# Patient Record
Sex: MALE | Race: WHITE | NOT HISPANIC OR LATINO | URBAN - METROPOLITAN AREA
[De-identification: names, ages, dates, MRNs, and addresses within clinical notes are randomized per-mention and may not be internally consistent; named-entity substitution may affect disease eponyms.]

---

## 2019-02-05 ENCOUNTER — RECORDS - HEALTHEAST (OUTPATIENT)
Dept: LAB | Facility: CLINIC | Age: 59
End: 2019-02-05

## 2019-02-05 LAB — POTASSIUM BLD-SCNC: 3.8 MMOL/L (ref 3.5–5)

## 2021-06-16 PROBLEM — S52.022K: Status: ACTIVE | Noted: 2019-02-04

## 2025-03-14 ENCOUNTER — ANESTHESIA EVENT (OUTPATIENT)
Dept: SURGERY | Facility: AMBULATORY SURGERY CENTER | Age: 65
End: 2025-03-14
Payer: COMMERCIAL

## 2025-03-14 RX ORDER — CARVEDILOL 12.5 MG/1
12.5 TABLET ORAL 2 TIMES DAILY WITH MEALS
COMMUNITY

## 2025-03-14 RX ORDER — ATORVASTATIN CALCIUM 20 MG/1
20 TABLET, FILM COATED ORAL DAILY
COMMUNITY

## 2025-03-14 RX ORDER — TAMSULOSIN HYDROCHLORIDE 0.4 MG/1
0.4 CAPSULE ORAL DAILY
COMMUNITY

## 2025-03-14 RX ORDER — AMLODIPINE BESYLATE 2.5 MG/1
2.5 TABLET ORAL DAILY
COMMUNITY

## 2025-03-17 ENCOUNTER — HOSPITAL ENCOUNTER (OUTPATIENT)
Facility: AMBULATORY SURGERY CENTER | Age: 65
Discharge: HOME | End: 2025-03-17
Attending: COLON & RECTAL SURGERY
Payer: COMMERCIAL

## 2025-03-17 ENCOUNTER — ANESTHESIA (OUTPATIENT)
Dept: SURGERY | Facility: AMBULATORY SURGERY CENTER | Age: 65
End: 2025-03-17
Payer: COMMERCIAL

## 2025-03-17 VITALS
OXYGEN SATURATION: 95 % | TEMPERATURE: 97.1 F | DIASTOLIC BLOOD PRESSURE: 57 MMHG | BODY MASS INDEX: 33.74 KG/M2 | WEIGHT: 215 LBS | RESPIRATION RATE: 16 BRPM | HEIGHT: 67 IN | SYSTOLIC BLOOD PRESSURE: 114 MMHG

## 2025-03-17 DIAGNOSIS — Z86.0100 PERSONAL HISTORY OF COLON POLYPS, UNSPECIFIED: ICD-10-CM

## 2025-03-17 LAB — COLONOSCOPY: NORMAL

## 2025-03-17 RX ORDER — FENTANYL CITRATE 0.05 MG/ML
25 INJECTION, SOLUTION INTRAMUSCULAR; INTRAVENOUS
Status: DISCONTINUED | OUTPATIENT
Start: 2025-03-17 | End: 2025-03-18 | Stop reason: HOSPADM

## 2025-03-17 RX ORDER — NALOXONE HYDROCHLORIDE 0.4 MG/ML
0.1 INJECTION, SOLUTION INTRAMUSCULAR; INTRAVENOUS; SUBCUTANEOUS
Status: DISCONTINUED | OUTPATIENT
Start: 2025-03-17 | End: 2025-03-18 | Stop reason: HOSPADM

## 2025-03-17 RX ORDER — OXYCODONE HYDROCHLORIDE 10 MG/1
10 TABLET ORAL
Status: DISCONTINUED | OUTPATIENT
Start: 2025-03-17 | End: 2025-03-18 | Stop reason: HOSPADM

## 2025-03-17 RX ORDER — OXYCODONE HYDROCHLORIDE 5 MG/1
5 TABLET ORAL
Status: DISCONTINUED | OUTPATIENT
Start: 2025-03-17 | End: 2025-03-18 | Stop reason: HOSPADM

## 2025-03-17 RX ORDER — SODIUM CHLORIDE, SODIUM LACTATE, POTASSIUM CHLORIDE, CALCIUM CHLORIDE 600; 310; 30; 20 MG/100ML; MG/100ML; MG/100ML; MG/100ML
INJECTION, SOLUTION INTRAVENOUS CONTINUOUS
Status: DISCONTINUED | OUTPATIENT
Start: 2025-03-17 | End: 2025-03-18 | Stop reason: HOSPADM

## 2025-03-17 RX ORDER — HYDROMORPHONE HCL IN WATER/PF 6 MG/30 ML
0.4 PATIENT CONTROLLED ANALGESIA SYRINGE INTRAVENOUS EVERY 5 MIN PRN
Status: DISCONTINUED | OUTPATIENT
Start: 2025-03-17 | End: 2025-03-18 | Stop reason: HOSPADM

## 2025-03-17 RX ORDER — ONDANSETRON 2 MG/ML
4 INJECTION INTRAMUSCULAR; INTRAVENOUS EVERY 30 MIN PRN
Status: DISCONTINUED | OUTPATIENT
Start: 2025-03-17 | End: 2025-03-18 | Stop reason: HOSPADM

## 2025-03-17 RX ORDER — DEXAMETHASONE SODIUM PHOSPHATE 4 MG/ML
4 INJECTION, SOLUTION INTRA-ARTICULAR; INTRALESIONAL; INTRAMUSCULAR; INTRAVENOUS; SOFT TISSUE
Status: DISCONTINUED | OUTPATIENT
Start: 2025-03-17 | End: 2025-03-18 | Stop reason: HOSPADM

## 2025-03-17 RX ORDER — ONDANSETRON 4 MG/1
4 TABLET, ORALLY DISINTEGRATING ORAL EVERY 30 MIN PRN
Status: DISCONTINUED | OUTPATIENT
Start: 2025-03-17 | End: 2025-03-18 | Stop reason: HOSPADM

## 2025-03-17 RX ORDER — GLYCOPYRROLATE 0.2 MG/ML
INJECTION, SOLUTION INTRAMUSCULAR; INTRAVENOUS PRN
Status: DISCONTINUED | OUTPATIENT
Start: 2025-03-17 | End: 2025-03-17

## 2025-03-17 RX ORDER — FENTANYL CITRATE 0.05 MG/ML
25 INJECTION, SOLUTION INTRAMUSCULAR; INTRAVENOUS EVERY 5 MIN PRN
Status: DISCONTINUED | OUTPATIENT
Start: 2025-03-17 | End: 2025-03-18 | Stop reason: HOSPADM

## 2025-03-17 RX ORDER — PROPOFOL 10 MG/ML
INJECTION, EMULSION INTRAVENOUS PRN
Status: DISCONTINUED | OUTPATIENT
Start: 2025-03-17 | End: 2025-03-17

## 2025-03-17 RX ORDER — LIDOCAINE 40 MG/G
CREAM TOPICAL
Status: DISCONTINUED | OUTPATIENT
Start: 2025-03-17 | End: 2025-03-18 | Stop reason: HOSPADM

## 2025-03-17 RX ORDER — HYDROMORPHONE HCL IN WATER/PF 6 MG/30 ML
0.2 PATIENT CONTROLLED ANALGESIA SYRINGE INTRAVENOUS EVERY 5 MIN PRN
Status: DISCONTINUED | OUTPATIENT
Start: 2025-03-17 | End: 2025-03-18 | Stop reason: HOSPADM

## 2025-03-17 RX ORDER — FENTANYL CITRATE 0.05 MG/ML
50 INJECTION, SOLUTION INTRAMUSCULAR; INTRAVENOUS EVERY 5 MIN PRN
Status: DISCONTINUED | OUTPATIENT
Start: 2025-03-17 | End: 2025-03-18 | Stop reason: HOSPADM

## 2025-03-17 RX ORDER — ACETAMINOPHEN 325 MG/1
975 TABLET ORAL ONCE
Status: DISCONTINUED | OUTPATIENT
Start: 2025-03-17 | End: 2025-03-18 | Stop reason: HOSPADM

## 2025-03-17 RX ORDER — PROPOFOL 10 MG/ML
INJECTION, EMULSION INTRAVENOUS CONTINUOUS PRN
Status: DISCONTINUED | OUTPATIENT
Start: 2025-03-17 | End: 2025-03-17

## 2025-03-17 RX ORDER — LIDOCAINE HYDROCHLORIDE 20 MG/ML
INJECTION, SOLUTION INFILTRATION; PERINEURAL PRN
Status: DISCONTINUED | OUTPATIENT
Start: 2025-03-17 | End: 2025-03-17

## 2025-03-17 RX ADMIN — SODIUM CHLORIDE, SODIUM LACTATE, POTASSIUM CHLORIDE, CALCIUM CHLORIDE: 600; 310; 30; 20 INJECTION, SOLUTION INTRAVENOUS at 07:54

## 2025-03-17 RX ADMIN — PROPOFOL 200 MCG/KG/MIN: 10 INJECTION, EMULSION INTRAVENOUS at 08:57

## 2025-03-17 RX ADMIN — PROPOFOL 10 MG: 10 INJECTION, EMULSION INTRAVENOUS at 08:59

## 2025-03-17 RX ADMIN — GLYCOPYRROLATE 0.2 MG: 0.2 INJECTION, SOLUTION INTRAMUSCULAR; INTRAVENOUS at 08:57

## 2025-03-17 RX ADMIN — LIDOCAINE HYDROCHLORIDE 3 ML: 20 INJECTION, SOLUTION INFILTRATION; PERINEURAL at 08:57

## 2025-03-17 RX ADMIN — PROPOFOL 40 MG: 10 INJECTION, EMULSION INTRAVENOUS at 08:57

## 2025-03-17 NOTE — DISCHARGE INSTRUCTIONS
Post-Colonoscopy Discharge Instructions:    1. You have just had an examination of your colon (large intestine) called a colonoscopy. You should have a full report of the findings to take with you today. It will list if any polyps were removed or if any biopsies were taken to send to the laboratory. If we did take out polyps or do biopsies, you should get a letter in the next 1-2 weeks if they are the standard pre-cancerous polyps. If it is anything more serious, your doctor will call you. The timing of your next colonoscopy is determined by your family history of polyps/colon cancer and what findings were on your colonoscopy. [facility] will keep track of when you are due for your next colonoscopy and contact you.     2. No driving or operating heavy power equipment today.    3. Do not drink alcohol for 12 hours after the procedure.     4. Do not sign any important or legally binding papers today.    5. No strenuous activity today (its a great day for a nap and to lay on the couch and watch some TV!).     6. You can eat whatever you like after your procedure.     7. You may experience drowsiness or forgetfulness at first. You may also notice altered bowel habits, excessive gas and belching or bloated feeling.    8. If recommended by your physician, you should avoid Aspirin, Aspirin products and arthritis medications for 7 - 10 days following the exam if biopsies or polypectomies have been performed.    9. You may develop soreness or redness where your IV medication was given. Apply warm wet cloths to the area for 15 minutes 3 - 4 times per day.    10. You may have a bloated, gaseous feeling in your abdomen after a colonoscopy for the next few hours. Passing gas and belching will help. Walking or lying down on your left side with your knees flexed may relieve the discomfort.    11. Call the office at [phone] right away if you notice any of the following:  a. Vomiting of blood and/or   coffee ground  material.  b. Rectal bleeding - >1Tbsp, blood clots, or continuous bleeding.  c. Severe abdominal pain.  d. Chills, fever over 101 degrees F.  e. Persistent nausea or vomiting.  f. Persistent disorientation/confusion.  g. Persistent coughing or spitting up blood.  h. Persistent redness, hardness, or tenderness at IV site.

## 2025-03-17 NOTE — ANESTHESIA PREPROCEDURE EVALUATION
Anesthesia Pre-Procedure Evaluation    Patient: Anthony Morgan   MRN: [id number] : 1960        Procedure : Procedure(s):  COLONOSCOPY          Past Medical History:   Diagnosis Date    Hypertension     Sleep apnea     Status post chemotherapy     last maintenance tx Tuesday, 3/12      Past Surgical History:   Procedure Laterality Date    BIOPSY          COLONOSCOPY      GI SURGERY      2024, 2024, 2024 bladder procedure r/t bladder CA      No Known Allergies   Social History     Tobacco Use    Smoking status: Former     Types: Cigarettes    Smokeless tobacco: Never    Tobacco comments:     Quit age [age]   Substance Use Topics    Alcohol use: Yes     Comment: drinks/ day: 2      Wt Readings from Last 1 Encounters:   25 97.5 kg (215 lb)        Anesthesia Evaluation   Pt has had prior anesthetic. Type: General and MAC.    No history of anesthetic complications       ROS/MED HX  ENT/Pulmonary:     (+) sleep apnea,                                       Neurologic:  - neg neurologic ROS     Cardiovascular:     (+)  hypertension- -   -  - -                                      METS/Exercise Tolerance: >4 METS    Hematologic:  - neg hematologic  ROS     Musculoskeletal:  - neg musculoskeletal ROS     GI/Hepatic:  - neg GI/hepatic ROS     Renal/Genitourinary:  - neg Renal ROS     Endo:  - neg endo ROS     Psychiatric/Substance Use:       Infectious Disease:  - neg infectious disease ROS     Malignancy:       Other:            Physical Exam    Airway  airway exam normal      Mallampati: II   TM distance: > 3 FB      Respiratory Devices and Support         Dental     Comment: Cosmetic dental work, discussed risk of dental trauma    (+) Minor Abnormalities - some fillings, tiny chips      Cardiovascular   cardiovascular exam normal       Rhythm and rate: regular and normal     Pulmonary   pulmonary exam normal                OUTSIDE LABS:  CBC:   Lab Results   Component Value Date    WBC 7.0  "02/04/2019    HGB 15.5 02/04/2019    HCT 45.8 02/04/2019     02/04/2019     BMP:   Lab Results   Component Value Date     02/04/2019    POTASSIUM 3.8 02/05/2019    POTASSIUM 4.0 02/04/2019    CHLORIDE 103 02/04/2019    CO2 26 02/04/2019    BUN 21 02/04/2019    CR 1.22 02/04/2019     (H) 02/04/2019     COAGS:   Lab Results   Component Value Date    INR 0.93 02/04/2019     POC: No results found for: \"BGM\", \"HCG\", \"HCGS\"  HEPATIC:   Lab Results   Component Value Date    ALBUMIN 3.8 02/04/2019    PROTTOTAL 7.0 02/04/2019    ALT 34 02/04/2019    AST 19 02/04/2019    ALKPHOS 62 02/04/2019    BILITOTAL 0.3 02/04/2019     OTHER:   Lab Results   Component Value Date    EMORY 9.3 02/04/2019       Anesthesia Plan    ASA Status:  2    NPO Status:  NPO Appropriate    Anesthesia Type: MAC.     - Reason for MAC: immobility needed              Consents    Anesthesia Plan(s) and associated risks, benefits, and realistic alternatives discussed. Questions answered and patient/representative(s) expressed understanding.     - Discussed:     - Discussed with:  Patient            Postoperative Care    Pain management: IV analgesics.        Comments:               Hyun Adair MD    I have reviewed the pertinent notes and labs in the chart from the past 30 days and (re)examined the patient.  Any updates or changes from those notes are reflected in this note.    Clinically Significant Risk Factors Present on Admission                             # Obesity: Estimated body mass index is 33.67 kg/m  as calculated from the following:    Height as of this encounter: 1.702 m (5' 7\").    Weight as of this encounter: 97.5 kg (215 lb).                "

## 2025-03-17 NOTE — H&P
[facility]   History and Physical       History of Present Illness: 64 year old male w/ hx of advanced adenomatous polyp and recent diverticulitis (Sept 2024).     Past Medical History:   Diagnosis Date    Hypertension     Sleep apnea     Status post chemotherapy     last maintenance tx Tuesday, 3/12       No Known Allergies    Past Surgical History:   Procedure Laterality Date    BIOPSY      2021    COLONOSCOPY      GI SURGERY      1/2024, 2/2024, 8/2024 bladder procedure r/t bladder CA       Family History   Problem Relation Age of Onset    Anesthesia Reaction No family hx of     Malignant Hyperthermia No family hx of        Social History     Socioeconomic History    Marital status:      Spouse name: Not on file    Number of children: Not on file    Years of education: Not on file    Highest education level: Not on file   Occupational History    Not on file   Tobacco Use    Smoking status: Former     Types: Cigarettes    Smokeless tobacco: Never    Tobacco comments:     Quit age [age]   Substance and Sexual Activity    Alcohol use: Yes     Comment: drinks/ day: 2    Drug use: Not Currently    Sexual activity: Not on file   Other Topics Concern    Not on file   Social History Narrative    Not on file     Social Drivers of Health     Financial Resource Strain: Low Risk  (12/5/2023)    Received from [facility]    Overall Financial Resource Strain (CARDIA)     Difficulty of Paying Living Expenses: Not hard at all   Food Insecurity: No Food Insecurity (12/5/2023)    Received from [facility]    Hunger Vital Sign     Worried About Running Out of Food in the Last Year: Never true     Ran Out of Food in the Last Year: Never true   Transportation Needs: No Transportation Needs (12/5/2023)    Received from [facility]    PRAPARE - Transportation     Lack of Transportation (Medical): No     Lack of Transportation (Non-Medical): No   Physical Activity: Inactive (12/5/2023)    Received from  [facility]    Exercise Vital Sign     Days of Exercise per Week: 0 days     Minutes of Exercise per Session: 0 min   Stress: Not on file   Social Connections: Socially Integrated (1/29/2023)    Received from [facility]    Social Connections     Frequency of Communication with Friends and Family: 0   Interpersonal Safety: Not At Risk (9/15/2024)    Received from [facility]     IP Custom IPV     Do you feel UNSAFE in any of your personal relationships with your family members or any other acquaintances?: No   Housing Stability: Low Risk  (12/5/2023)    Received from [facility]    Housing Stability     What is your living situation today?: I have a steady place to live       Current Outpatient Medications   Medication Sig Dispense Refill    amLODIPine (NORVASC) 2.5 MG tablet Take 2.5 mg by mouth daily.      atorvastatin (LIPITOR) 20 MG tablet Take 20 mg by mouth daily.      carvedilol (COREG) 12.5 MG tablet Take 12.5 mg by mouth 2 times daily (with meals).      sacubitril-valsartan (ENTRESTO)  MG per tablet Take 1 tablet by mouth 2 times daily.      tamsulosin (FLOMAX) 0.4 MG capsule Take 0.4 mg by mouth daily.      oxyCODONE-acetaminophen (PERCOCET/ENDOCET) 5-325 mg per tablet [OXYCODONE-ACETAMINOPHEN (PERCOCET/ENDOCET) 5-325 MG PER TABLET] Take 1 tablet by mouth every 4 (four) hours as needed for pain. 13 tablet 0     Current Facility-Administered Medications   Medication Dose Route Frequency Provider Last Rate Last Admin    lactated ringers infusion   Intravenous Continuous Ted Florez MD 10 mL/hr at 03/17/25 0754 New Bag at 03/17/25 0754    lidocaine (LMX4) kit   Topical Q1H PRN Ted Florez MD        lidocaine 1 % 0.1-1 mL  0.1-1 mL Other Q1H PRN Ted Florez MD        sodium chloride (PF) 0.9% PF flush 3 mL  3 mL Intracatheter Q8H Ted Florez MD        sodium chloride (PF) 0.9% PF flush 3 mL  3 mL Intracatheter q1 min  Ted Sharma MD         Facility-Administered Medications Ordered in Other Encounters   Medication Dose Route Frequency Provider Last Rate Last Admin    glycopyrrolate (ROBINUL) injection   Intravenous PRN Edgardo Rolandle ETELVINA, APRN CRNA   0.2 mg at 03/17/25 0857    lidocaine 2% injection (MDV)   Intravenous PRN Luan Susanna E, APRN CRNA   3 mL at 03/17/25 0857    propofol (DIPRIVAN) infusion   Intravenous Continuous PRN Luan Susanna E, APRN CRNA   Stopped at 03/17/25 0920    propofol (DIPRIVAN) injection 10 mg/mL vial   Intravenous PRN Luan Susanna E, APRN CRNA   10 mg at 03/17/25 0859       Review of Systems:   A full 10 point review of systems was taken and is negative aside from what is noted above in the HPI.    Consitutional / General: Denies wt changes, fevers, chills or sweats.  Skin: Denies rashes, bruising, pruritis, or bleeding tendencies.   ENT: Denies trauma, headache, hearing loss, tinnitus, dysphagia  Eyes: Denies double vision  Respiratory: Denies SOB, cough, sputum production or dyspnea on exertion.   Cardiovascular: Denies chest pain, palpitations, orthostatic hypotension  Hematology / Lymph: Denies hx of blood clots or pulmonary embolism  Gastrointestinal:  Denies loss of appetite, dysphagia, N/V, constipation or diarrhea.   Genitourinary: Denies dysuria, frequency, urgency, hesitancy, incontinence, nocturia, hematuria, difficulty starting or stopping their stream, hx of stones or hx of uti's.   Neurologic: Denies headache, LOC, memory loss, vertigo, syncope or seizures.   Musculoskeletal: Denies back pain, numbness, tingling or hx of back surgery  Psychological: alert and oriented    Intake/Output past 24 hrs:  No intake or output data in the 24 hours ending 03/17/25 0923    Physical Exam:  Temp:  [97.8  F (36.6  C)] 97.8  F (36.6  C)  Resp:  [16] 16  BP: (142)/(76) 142/76  SpO2:  [95 %] 95 %  General: NAD, alert, cooperative  Head: normocephalic, without abnormality /  atraumatic  Respiratory: non-labored breathing, CTA-B  Cardiac: RRR +S1/S2  Abdomen: soft, non-tender, non-distended.  No guarding or rebound   Perianal/Rectal: deferred   Skin: no rashes or lesions  Musculoskeletal: moves all four extremities equally; no calf edema or tenderness  Psychological: alert and oriented, answers questions appropriately  Neurological: cranial nerves grossly intact    CBC RESULTS:   Recent Labs   Lab Test 02/04/19  1253   WBC 7.0   RBC 4.95   HGB 15.5   HCT 45.8   MCV 93   MCH 31.3   MCHC 33.8   RDW 12.5          Last Comprehensive Metabolic Panel:  Sodium   Date Value Ref Range Status   02/04/2019 141 136 - 145 mmol/L Final     Potassium   Date Value Ref Range Status   02/05/2019 3.8 3.5 - 5.0 mmol/L Final     Chloride   Date Value Ref Range Status   02/04/2019 103 98 - 107 mmol/L Final     Carbon Dioxide (CO2)   Date Value Ref Range Status   02/04/2019 26 22 - 31 mmol/L Final     Anion Gap   Date Value Ref Range Status   02/04/2019 12 5 - 18 mmol/L Final     Glucose   Date Value Ref Range Status   02/04/2019 141 (H) 70 - 125 mg/dL Final     Urea Nitrogen   Date Value Ref Range Status   02/04/2019 21 8 - 22 mg/dL Final     Creatinine   Date Value Ref Range Status   02/04/2019 1.22 0.70 - 1.30 mg/dL Final     GFR Estimate   Date Value Ref Range Status   02/04/2019 >60 >60 mL/min/1.73m2 Final     Calcium   Date Value Ref Range Status   02/04/2019 9.3 8.5 - 10.5 mg/dL Final       Assessment: Anthony Morgan is a 64 year old male w/ hx of advanced adenomatous colon polyp and recent diverticulitis presenting for colonoscopy.     Plan: Colonoscopy under CARMEN De Leon MD, SHAYAN  [facility]  Office: [phone]  3/17/2025 9:23 AM

## 2025-03-17 NOTE — ANESTHESIA CARE TRANSFER NOTE
Patient: Anthony Morgan    Procedure: Procedure(s):  COLONOSCOPY with snare polypectomy       Diagnosis: Personal history of colon polyps, unspecified [Z86.0100]  Diagnosis Additional Information: No value filed.    Anesthesia Type:   MAC     Note:    Oropharynx: oropharynx clear of all foreign objects and spontaneously breathing  Level of Consciousness: awake  Oxygen Supplementation: room air    Independent Airway: airway patency satisfactory and stable  Dentition: dentition unchanged  Vital Signs Stable: post-procedure vital signs reviewed and stable  Report to RN Given: handoff report given  Patient transferred to: Phase II    Handoff Report: Identifed the Patient, Identified the Reponsible Provider, Reviewed the pertinent medical history, Discussed the surgical course, Reviewed Intra-OP anesthesia mangement and issues during anesthesia, Set expectations for post-procedure period and Allowed opportunity for questions and acknowledgement of understanding      Vitals:  Vitals Value Taken Time   /62 03/17/25 0925   Temp 97.1  F (36.2  C) 03/17/25 0925   Pulse 67 03/17/25 0925   Resp 16 03/17/25 0925   SpO2 95 % 03/17/25 0925       Electronically Signed By: KAUSHIK Kaba CRNA  March 17, 2025  9:27 AM   5 (moderate pain)